# Patient Record
Sex: MALE | Race: WHITE | NOT HISPANIC OR LATINO
[De-identification: names, ages, dates, MRNs, and addresses within clinical notes are randomized per-mention and may not be internally consistent; named-entity substitution may affect disease eponyms.]

---

## 2021-10-04 PROBLEM — Z00.00 ENCOUNTER FOR PREVENTIVE HEALTH EXAMINATION: Status: ACTIVE | Noted: 2021-10-04

## 2021-10-08 DIAGNOSIS — K85.80 OTHER ACUTE PANCREATITIS WITHOUT NECROSIS OR INFECTION: ICD-10-CM

## 2021-11-04 ENCOUNTER — RESULT REVIEW (OUTPATIENT)
Age: 46
End: 2021-11-04

## 2021-11-09 ENCOUNTER — APPOINTMENT (OUTPATIENT)
Dept: GASTROENTEROLOGY | Facility: CLINIC | Age: 46
End: 2021-11-09
Payer: COMMERCIAL

## 2021-11-09 PROCEDURE — 99443: CPT

## 2021-11-26 DIAGNOSIS — Z01.812 ENCOUNTER FOR PREPROCEDURAL LABORATORY EXAMINATION: ICD-10-CM

## 2021-11-27 ENCOUNTER — APPOINTMENT (OUTPATIENT)
Dept: DISASTER EMERGENCY | Facility: CLINIC | Age: 46
End: 2021-11-27

## 2021-11-29 LAB — SARS-COV-2 N GENE NPH QL NAA+PROBE: NOT DETECTED

## 2021-11-30 ENCOUNTER — APPOINTMENT (OUTPATIENT)
Dept: GASTROENTEROLOGY | Facility: HOSPITAL | Age: 46
End: 2021-11-30

## 2021-11-30 ENCOUNTER — OUTPATIENT (OUTPATIENT)
Dept: OUTPATIENT SERVICES | Facility: HOSPITAL | Age: 46
LOS: 1 days | End: 2021-11-30
Payer: COMMERCIAL

## 2021-11-30 VITALS
RESPIRATION RATE: 22 BRPM | WEIGHT: 235.01 LBS | OXYGEN SATURATION: 97 % | TEMPERATURE: 97 F | DIASTOLIC BLOOD PRESSURE: 70 MMHG | SYSTOLIC BLOOD PRESSURE: 137 MMHG | HEART RATE: 73 BPM

## 2021-11-30 VITALS
OXYGEN SATURATION: 97 % | WEIGHT: 235.01 LBS | TEMPERATURE: 97 F | HEART RATE: 73 BPM | RESPIRATION RATE: 22 BRPM | DIASTOLIC BLOOD PRESSURE: 70 MMHG | SYSTOLIC BLOOD PRESSURE: 137 MMHG

## 2021-11-30 DIAGNOSIS — K85.80 OTHER ACUTE PANCREATITIS WITHOUT NECROSIS OR INFECTION: ICD-10-CM

## 2021-11-30 DIAGNOSIS — Z98.890 OTHER SPECIFIED POSTPROCEDURAL STATES: Chronic | ICD-10-CM

## 2021-11-30 PROCEDURE — 43259 EGD US EXAM DUODENUM/JEJUNUM: CPT

## 2021-11-30 PROCEDURE — 43259 EGD US EXAM DUODENUM/JEJUNUM: CPT | Mod: GC

## 2021-11-30 RX ORDER — BUDESONIDE, MICRONIZED 100 %
0 POWDER (GRAM) MISCELLANEOUS
Qty: 0 | Refills: 0 | DISCHARGE

## 2021-11-30 RX ORDER — ATORVASTATIN CALCIUM 80 MG/1
1 TABLET, FILM COATED ORAL
Qty: 0 | Refills: 0 | DISCHARGE

## 2021-11-30 RX ORDER — NIFEDIPINE 30 MG
1 TABLET, EXTENDED RELEASE 24 HR ORAL
Qty: 0 | Refills: 0 | DISCHARGE

## 2021-11-30 RX ORDER — METOPROLOL TARTRATE 50 MG
1 TABLET ORAL
Qty: 0 | Refills: 0 | DISCHARGE

## 2021-11-30 RX ORDER — LISINOPRIL 2.5 MG/1
1 TABLET ORAL
Qty: 0 | Refills: 0 | DISCHARGE

## 2021-11-30 RX ORDER — SODIUM CHLORIDE 9 MG/ML
500 INJECTION INTRAMUSCULAR; INTRAVENOUS; SUBCUTANEOUS
Refills: 0 | Status: COMPLETED | OUTPATIENT
Start: 2021-11-30 | End: 2021-11-30

## 2021-11-30 RX ADMIN — SODIUM CHLORIDE 75 MILLILITER(S): 9 INJECTION INTRAMUSCULAR; INTRAVENOUS; SUBCUTANEOUS at 08:30

## 2021-11-30 NOTE — PRE PROCEDURE NOTE - PRE PROCEDURE EVALUATION
Attending Physician:         Dr. Sparks                   Procedure: EUS     Indication for Procedure: recurrent acute pancreatitis   ________________________________________________________  PAST MEDICAL & SURGICAL HISTORY:    ALLERGIES:  Allergy Status Unknown    HOME MEDICATIONS:    AICD/PPM: [ ] yes   [ ] no    PERTINENT LAB DATA:                      PHYSICAL EXAMINATION:    T(C): --  HR: --  BP: --  RR: --  SpO2: --    Constitutional: NAD  HEENT: PERRLA, EOMI,    Neck:  No JVD  Respiratory: CTAB/L  Cardiovascular: S1 and S2  Gastrointestinal: BS+, soft, NT/ND  Extremities: No peripheral edema  Neurological: A/O x 3, no focal deficits  Psychiatric: Normal mood, normal affect  Skin: No rashes    ASA Class: I [ ]  II [ ]  III [ ]  IV [ ]    COMMENTS:    The patient is a suitable candidate for the planned procedure unless box checked [ ]  No, explain:

## 2021-11-30 NOTE — ASU PATIENT PROFILE, ADULT - FALL HARM RISK - UNIVERSAL INTERVENTIONS
Bed in lowest position, wheels locked, appropriate side rails in place/Instruct patient to call for assistance before getting out of bed or chair/Non-slip footwear when patient is out of bed/Physically safe environment - no spills, clutter or unnecessary equipment/Purposeful Proactive Rounding

## 2021-11-30 NOTE — ASU DISCHARGE PLAN (ADULT/PEDIATRIC) - NS MD DC FALL RISK RISK
For information on Fall & Injury Prevention, visit: [email or web address] OR  [email or web address] OR  https://www.cdc.gov/steadi/patient.html

## 2022-05-23 PROBLEM — I10 ESSENTIAL (PRIMARY) HYPERTENSION: Chronic | Status: ACTIVE | Noted: 2021-11-30

## 2022-05-23 PROBLEM — K21.9 GASTRO-ESOPHAGEAL REFLUX DISEASE WITHOUT ESOPHAGITIS: Chronic | Status: ACTIVE | Noted: 2021-11-30

## 2022-05-23 PROBLEM — E78.5 HYPERLIPIDEMIA, UNSPECIFIED: Chronic | Status: ACTIVE | Noted: 2021-11-30

## 2022-07-07 ENCOUNTER — APPOINTMENT (OUTPATIENT)
Dept: GASTROENTEROLOGY | Facility: CLINIC | Age: 47
End: 2022-07-07

## 2022-07-07 VITALS
SYSTOLIC BLOOD PRESSURE: 128 MMHG | OXYGEN SATURATION: 98 % | WEIGHT: 253.5 LBS | DIASTOLIC BLOOD PRESSURE: 75 MMHG | HEART RATE: 78 BPM | HEIGHT: 72 IN | BODY MASS INDEX: 34.34 KG/M2

## 2022-07-07 DIAGNOSIS — Z87.891 PERSONAL HISTORY OF NICOTINE DEPENDENCE: ICD-10-CM

## 2022-07-07 DIAGNOSIS — K65.4 SCLEROSING MESENTERITIS: ICD-10-CM

## 2022-07-07 DIAGNOSIS — Z80.1 FAMILY HISTORY OF MALIGNANT NEOPLASM OF TRACHEA, BRONCHUS AND LUNG: ICD-10-CM

## 2022-07-07 PROCEDURE — 99214 OFFICE O/P EST MOD 30 MIN: CPT

## 2022-07-07 RX ORDER — OMEPRAZOLE 20 MG/1
20 CAPSULE, DELAYED RELEASE ORAL DAILY
Qty: 1 | Refills: 0 | Status: ACTIVE | COMMUNITY
Start: 2022-07-07 | End: 1900-01-01

## 2022-07-08 LAB
CREAT SERPL-MCNC: 0.95 MG/DL
CRP SERPL-MCNC: <3 MG/L
EGFR: 101 ML/MIN/1.73M2
ERYTHROCYTE [SEDIMENTATION RATE] IN BLOOD BY WESTERGREN METHOD: 14 MM/HR

## 2022-07-11 PROBLEM — Z80.1 FAMILY HISTORY OF LUNG CANCER: Status: ACTIVE | Noted: 2022-07-11

## 2022-07-11 PROBLEM — Z87.891 FORMER SMOKER: Status: ACTIVE | Noted: 2022-07-11

## 2022-07-11 LAB
ANA SER IF-ACNC: NEGATIVE
ENA SCL70 IGG SER IA-ACNC: <0.2 AL
SMOOTH MUSCLE AB SER QL IF: NORMAL

## 2022-07-11 NOTE — CONSULT LETTER
[Dear  ___] : Dear  [unfilled], [Consult Letter:] : I had the pleasure of evaluating your patient, [unfilled]. [Please see my note below.] : Please see my note below. [Consult Closing:] : Thank you very much for allowing me to participate in the care of this patient.  If you have any questions, please do not hesitate to contact me. [Sincerely,] : Sincerely, [FreeTextEntry3] : Kwesi Bonilla MD, MPH, ARIE, RDAHA\par Chief of Clinical Quality in Gastroenterology, [facility]\par Associate Chief of Gastroenterology, [facility]\par Interim Director of Endoscopy, [facility]\par Director of Endoscopic Ultrasound, [facility]\par [address], [address]\par [facility] [place] [facility], [address]\par 24 hours [phone]\par \par  [DrHomar  ___] : Dr. KESSLER

## 2022-07-11 NOTE — HISTORY OF PRESENT ILLNESS
[FreeTextEntry1] : Patient referred by primary gastroenterologist Dr. Kaleb Ramirez for mesenteric panniculitis\par Has vague upper abdominal pain within 1 year.\par No fevers, chills, sweats, heartburn, dysphagia, nausea, vomiting, constipation, diarrhea, melena, hematochezia, jaundice or weight loss.\par \par No family history of inflammatory conditions\par Lung cancer in father (smoker / )\par \par Had CT 3/17/22 abd/pelvis with contrast, report reviewed.  Radiologist compared to prior CT scan 2021.\par Normal pancreas.  Normal bile ducts.\par Minimal stable stranding within the mesentery with stable prominent mesenteric lymph nodes\par \par \par EGD/EUS/colonoscopy within past 1 year\par  EGD/EUS: 11/30/21:\par Findings:\par      ENDOSCOPIC FINDING: :\par      LA Grade B (one or more mucosal breaks greater than 5 mm, not extending between the tops of \par      two mucosal folds) esophagitis with no bleeding was found in the distal esophagus.\par      No other significant abnormalities were identified in a careful examination of the esophagus.\par      The entire examined stomach was normal.\par      The duodenal bulb and second portion of the duodenum were normal.\par      ENDOSONOGRAPHIC FINDING: :\par      An EUS exam was performed with a linear echoendoscope. There was mild heterogenity of the \par      pancreatic body. No mass lesions or cysts seen.\par      There was othwerise no sign of significant endosonographic abnormality in the pancreatic \par      head, remainder of the pancreatic body, neck, pancreatic tail, and peripancreatic region. The \par      pancreas was well visualized, no masses, no cysts, no calcifications seen. The main PD was \par      unremarkable. There was a 10mm triangular-shaped peripancreatic lymph node that was noted.\par      There was no sign of significant endosonographic abnormality in the common bile duct and in \par      the gallbladder. No stones, no biliary sludge, ducts of normal caliber and ducts with regular \par      contour were identified.\par      There was no sign of significant endosonographic abnormality in the visualized portion of the \par      liver. No focal pathology and no pathologic lymphadenopathy were identified.\par      The region of the celiac plexus and celiac ganglia was visualized and showed no sign of \par      significant endosonographic abnormality. The vascular anatomy of the region was unremarkable.\par                                                                                                     \par Impression:          EUS exam without any pancreatic lesions or CBD stones. No etiology of \par                      pancreatitis found.\par \par

## 2022-07-11 NOTE — ASSESSMENT
[FreeTextEntry1] : Impression:\par \par #1.  Referred for mesenteric panniculitis on CT scan, since at least 2021.  Work-up has included upper endoscopy, endoscopic ultrasound, colonoscopy, serial CT scan.\par \par Differential diagnosis of mesenteric panniculitis includes inflammatory/infectious conditions, neoplastic conditions\par \par #2.  Chronic upper abdominal pain\par \par #3.  History of acute pancreatitis in 2021, endoscopic ultrasound negative for obvious etiology.\par \par Recommendations:\par \par #1.  Laboratory testing as below\par \par #2.  MRI/MRCP with and without contrast\par \par #3.  Dr. Ramirez's [place] group to perform video capsule endoscopy as an outpatient to look for small bowel lesions.\par \par #4.  If no etiology found on above testing, would have patient referred to rheumatology.\par \par

## 2023-01-30 ENCOUNTER — APPOINTMENT (OUTPATIENT)
Dept: ORTHOPEDIC SURGERY | Facility: CLINIC | Age: 48
End: 2023-01-30
Payer: OTHER MISCELLANEOUS

## 2023-01-30 VITALS — HEIGHT: 72 IN | WEIGHT: 265 LBS | BODY MASS INDEX: 35.89 KG/M2

## 2023-01-30 DIAGNOSIS — I10 ESSENTIAL (PRIMARY) HYPERTENSION: ICD-10-CM

## 2023-01-30 DIAGNOSIS — E78.00 PURE HYPERCHOLESTEROLEMIA, UNSPECIFIED: ICD-10-CM

## 2023-01-30 PROCEDURE — 99203 OFFICE O/P NEW LOW 30 MIN: CPT

## 2023-01-30 PROCEDURE — 99072 ADDL SUPL MATRL&STAF TM PHE: CPT

## 2023-01-30 RX ORDER — MELOXICAM 15 MG/1
15 TABLET ORAL
Qty: 30 | Refills: 0 | Status: ACTIVE | COMMUNITY
Start: 2023-01-30 | End: 1900-01-01

## 2023-01-30 NOTE — HISTORY OF PRESENT ILLNESS
[Work related] : work related [Gradual] : gradual [Sudden] : sudden [6] : 6 [5] : 5 [Dull/Aching] : dull/aching [Shooting] : shooting [Tingling] : tingling [Sleep] : sleep [Nothing helps with pain getting better] : Nothing helps with pain getting better [Full time] : Work status: full time [de-identified] : He was pounding channels at work\par L elbow and forearm pain  [] : no [FreeTextEntry1] : left elbow  [FreeTextEntry3] : 01/11/23 [FreeTextEntry5] : patient states when he was at work was pounding a channel into the ground, he cant  is having pain  [FreeTextEntry6] : numbness [FreeTextEntry7] : down the arm  [de-identified] : activity  [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

## 2023-01-30 NOTE — PHYSICAL EXAM
[de-identified] : L elbow:\par mild swelling\par Tender lateral epicondyle\par Pain with ROM\par Pain with forced wrist extension

## 2023-02-02 ENCOUNTER — NON-APPOINTMENT (OUTPATIENT)
Age: 48
End: 2023-02-02

## 2023-02-27 ENCOUNTER — APPOINTMENT (OUTPATIENT)
Dept: ORTHOPEDIC SURGERY | Facility: CLINIC | Age: 48
End: 2023-02-27
Payer: OTHER MISCELLANEOUS

## 2023-02-27 VITALS — HEIGHT: 72 IN | WEIGHT: 265 LBS | BODY MASS INDEX: 35.89 KG/M2

## 2023-02-27 DIAGNOSIS — S53.402A UNSPECIFIED SPRAIN OF LEFT ELBOW, INITIAL ENCOUNTER: ICD-10-CM

## 2023-02-27 PROCEDURE — 99072 ADDL SUPL MATRL&STAF TM PHE: CPT

## 2023-02-27 PROCEDURE — 99213 OFFICE O/P EST LOW 20 MIN: CPT

## 2023-02-27 NOTE — HISTORY OF PRESENT ILLNESS
[Work related] : work related [3] : 3 [1] : 2 [Dull/Aching] : dull/aching [Full time] : Work status: full time [] : yes [de-identified] : L elbow is better\par NSAIDS and therapy helping  [FreeTextEntry1] :  left elbow  [FreeTextEntry5] : pain is better\par  [de-identified] : no

## 2023-02-27 NOTE — PHYSICAL EXAM
[de-identified] : L elbow:\par mild swelling\par Tender lateral epicondyle\par Pain with ROM\par Pain with forced wrist extension

## 2023-04-20 ENCOUNTER — APPOINTMENT (OUTPATIENT)
Dept: ORTHOPEDIC SURGERY | Facility: CLINIC | Age: 48
End: 2023-04-20
Payer: OTHER MISCELLANEOUS

## 2023-04-20 VITALS — WEIGHT: 265 LBS | HEIGHT: 72 IN | BODY MASS INDEX: 35.89 KG/M2

## 2023-04-20 PROCEDURE — 99213 OFFICE O/P EST LOW 20 MIN: CPT

## 2023-04-21 NOTE — HISTORY OF PRESENT ILLNESS
[Work related] : work related [5] : 5 [4] : 4 [Dull/Aching] : dull/aching [Full time] : Work status: full time [de-identified] : L elbow is stable [FreeTextEntry1] : L elbow [de-identified] : none

## 2023-04-21 NOTE — PHYSICAL EXAM
[de-identified] : L elbow:\par mild swelling\par Tender lateral epicondyle\par Pain with ROM\par Pain with forced wrist extension

## 2023-05-25 ENCOUNTER — RX RENEWAL (OUTPATIENT)
Age: 48
End: 2023-05-25

## 2023-05-25 RX ORDER — MELOXICAM 15 MG/1
15 TABLET ORAL
Qty: 30 | Refills: 3 | Status: ACTIVE | COMMUNITY
Start: 2023-02-27 | End: 1900-01-01

## 2023-06-14 ENCOUNTER — NON-APPOINTMENT (OUTPATIENT)
Age: 48
End: 2023-06-14

## 2023-06-14 ENCOUNTER — APPOINTMENT (OUTPATIENT)
Dept: ORTHOPEDIC SURGERY | Facility: CLINIC | Age: 48
End: 2023-06-14
Payer: OTHER MISCELLANEOUS

## 2023-06-14 VITALS — WEIGHT: 265 LBS | BODY MASS INDEX: 35.89 KG/M2 | HEIGHT: 72 IN

## 2023-06-14 PROCEDURE — 99214 OFFICE O/P EST MOD 30 MIN: CPT

## 2023-06-14 RX ORDER — METHYLPREDNISOLONE 4 MG/1
4 TABLET ORAL
Qty: 1 | Refills: 0 | Status: ACTIVE | COMMUNITY
Start: 2023-06-14

## 2023-06-14 NOTE — WORK
[Sprain/Strain] : sprain/strain [Was the competent medical cause of the injury] : was the competent medical cause of the injury [Are consistent with the injury] : are consistent with the injury [Consistent with my objective findings] : consistent with my objective findings [Total (100%)] : total (100%) [Does not reveal pre-existing condition(s) that may affect treatment/prognosis] : does not reveal pre-existing condition(s) that may affect treatment/prognosis [Cannot return to work because ________] : cannot return to work because [unfilled] [Rx may affect patient's ability to return to work, make patient drowsy, or other issue] : Rx may affect patient's ability to return to work, make patient drowsy, or other issue. [FreeTextEntry1] : guarded

## 2023-06-14 NOTE — IMAGING
[de-identified] : L spine\par \par Inspection: No rash or ecchymosis \par \par Palpation: Midline lumbar tenderness. R lumbar paraspinal tenderness\par \par ROM: diminished all planes\par \par Strength: 5/5 bilateral hip flexors, knee extensors, ankle dorsiflexors, EHL, ankle plantarflexors \par \par Sensation: Sensation present to light touch bilateral L2-S1 distributions \par \par Provocative maneuvers: + R SLR, - L SLR. Tight hamstrings bilaterally\par \par \par MRI Lumbar spine reviewed and interpreted independently.  Agree with report as follows. \par \par Lowest lumbar type vertebra is labeled L5, but on the axial views there is a suggestion for possible partial sacralization. If necessary, correlation with plain films is suggested.\par  \par \par  \par \par  \par \par Levoscoliosis with preservation of the lumbar lordosis.\par  \par \par  \par \par  \par \par L1-L2 facets are hypertrophic with abutment of the lateral recesses and encroachment on the posterior thecal sac along with ligamentum flavum hypertrophy.\par  \par \par  \par \par  \par \par L2-L3 annular disc bulge abutting the anterior thecal sac with associated inferior foraminal disc extensions approaching the exiting left L2 nerve root. Facets are hypertrophic with encroachment on the lateral recesses, left greater than right, and encroachment on the posterior thecal sac along with ligamentum flavum hypertrophy.\par  \par \par  \par \par  \par \par L3-L4 annular disc bulge abutting the anterior thecal sac with associated inferior foraminal disc extensions abutting the exiting L3 nerve roots and abutting the adjacent traversing proximal neural fibers. Facets are hypertrophic with impingement on the lateral recesses and encroachment on the posterior thecal sac along with ligamentum flavum hypertrophy.\par  \par \par  \par \par  \par \par L4-L5 annular disc bulge abutting the anterior thecal sac with associated inferior foraminal disc extensions encroaching on the exiting L4 nerve roots, right slightly greater than left, and abutting the adjacent traversing proximal right neural fibers. Facets are hypertrophic with impingement on the lateral recesses. Central stenosis related to the above and ligamentum flavum hypertrophy.\par  \par \par  \par \par  \par \par L5-S1 annular disc bulge abutting the anterior thecal sac with associated inferior foraminal disc extensions abutting the exiting L5 nerve roots and abutting the traversing proximal right S1 nerve root. Facets are hypertrophic with impingement on the lateral recesses. Shallow central canal related to the above.\par  \par \par  \par \par  \par \par Loss of vertebral body height of T12 without underlying edema, nonspecific, and could be related to chronic compression injury. Clinical correlation and correlation with plain films if necessary is suggested.\par  \par \par  \par \par  \par \par Lesion in the right posterior L3 vertebra, nonspecific, but consistent with a hemangioma. There may be a small hemangioma in the right sacrum as well. Periodic follow-up is suggested to assess stability.

## 2023-06-14 NOTE — ASSESSMENT
[FreeTextEntry1] : Patient is OOW\par PT\par MDP\par FU 4 weeks\par I fpain perists will send for ADRIAN\par  We will also prescribe Muscle Relaxants as needed.  Discussed side effects including but not limited to drowsiness and dizziness.  Discussed patient should not drive after taking the medicine. \par \par Patient seen by Sravanthi Rodriguez PA-C, with and under the supervision of Dr. Satish Bender MD
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
## 2023-07-10 ENCOUNTER — FORM ENCOUNTER (OUTPATIENT)
Age: 48
End: 2023-07-10

## 2023-07-12 ENCOUNTER — NON-APPOINTMENT (OUTPATIENT)
Age: 48
End: 2023-07-12

## 2023-07-12 ENCOUNTER — APPOINTMENT (OUTPATIENT)
Dept: ORTHOPEDIC SURGERY | Facility: CLINIC | Age: 48
End: 2023-07-12

## 2023-07-12 ENCOUNTER — APPOINTMENT (OUTPATIENT)
Dept: ORTHOPEDIC SURGERY | Facility: CLINIC | Age: 48
End: 2023-07-12
Payer: OTHER MISCELLANEOUS

## 2023-07-12 VITALS — HEIGHT: 72 IN | WEIGHT: 265 LBS | BODY MASS INDEX: 35.89 KG/M2

## 2023-07-12 PROCEDURE — ZZZZZ: CPT

## 2023-07-12 PROCEDURE — 99213 OFFICE O/P EST LOW 20 MIN: CPT
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
## 2023-07-12 NOTE — IMAGING
[de-identified] : L spine\par \par Inspection: No rash or ecchymosis \par \par Palpation: Midline lumbar tenderness. R lumbar paraspinal tenderness\par \par ROM: diminished all planes\par \par Strength: 5/5 bilateral hip flexors, knee extensors, ankle dorsiflexors, EHL, ankle plantarflexors \par \par Sensation: Sensation present to light touch bilateral L2-S1 distributions \par \par Provocative maneuvers: + R SLR, - L SLR. Tight hamstrings bilaterally\par \par \par MRI Lumbar spine reviewed and interpreted independently.  Agree with report as follows. \par \par Lowest lumbar type vertebra is labeled L5, but on the axial views there is a suggestion for possible partial sacralization. If necessary, correlation with plain films is suggested.\par  \par \par  \par \par  \par \par Levoscoliosis with preservation of the lumbar lordosis.\par  \par \par  \par \par  \par \par L1-L2 facets are hypertrophic with abutment of the lateral recesses and encroachment on the posterior thecal sac along with ligamentum flavum hypertrophy.\par  \par \par  \par \par  \par \par L2-L3 annular disc bulge abutting the anterior thecal sac with associated inferior foraminal disc extensions approaching the exiting left L2 nerve root. Facets are hypertrophic with encroachment on the lateral recesses, left greater than right, and encroachment on the posterior thecal sac along with ligamentum flavum hypertrophy.\par  \par \par  \par \par  \par \par L3-L4 annular disc bulge abutting the anterior thecal sac with associated inferior foraminal disc extensions abutting the exiting L3 nerve roots and abutting the adjacent traversing proximal neural fibers. Facets are hypertrophic with impingement on the lateral recesses and encroachment on the posterior thecal sac along with ligamentum flavum hypertrophy.\par  \par \par  \par \par  \par \par L4-L5 annular disc bulge abutting the anterior thecal sac with associated inferior foraminal disc extensions encroaching on the exiting L4 nerve roots, right slightly greater than left, and abutting the adjacent traversing proximal right neural fibers. Facets are hypertrophic with impingement on the lateral recesses. Central stenosis related to the above and ligamentum flavum hypertrophy.\par  \par \par  \par \par  \par \par L5-S1 annular disc bulge abutting the anterior thecal sac with associated inferior foraminal disc extensions abutting the exiting L5 nerve roots and abutting the traversing proximal right S1 nerve root. Facets are hypertrophic with impingement on the lateral recesses. Shallow central canal related to the above.\par  \par \par  \par \par  \par \par Loss of vertebral body height of T12 without underlying edema, nonspecific, and could be related to chronic compression injury. Clinical correlation and correlation with plain films if necessary is suggested.\par  \par \par  \par \par  \par \par Lesion in the right posterior L3 vertebra, nonspecific, but consistent with a hemangioma. There may be a small hemangioma in the right sacrum as well. Periodic follow-up is suggested to assess stability.

## 2023-07-12 NOTE — ASSESSMENT
[FreeTextEntry1] : Patient is OOW\par C/w PT\par \par FU 4 weeks\par If pain perists will send for ADRIAN\par  We will also prescribe Muscle Relaxants as needed.  Discussed side effects including but not limited to drowsiness and dizziness.  Discussed patient should not drive after taking the medicine. \par

## 2023-08-07 ENCOUNTER — APPOINTMENT (OUTPATIENT)
Dept: ORTHOPEDIC SURGERY | Facility: CLINIC | Age: 48
End: 2023-08-07
Payer: OTHER MISCELLANEOUS

## 2023-08-07 VITALS — WEIGHT: 265 LBS | BODY MASS INDEX: 35.89 KG/M2 | HEIGHT: 72 IN

## 2023-08-07 DIAGNOSIS — M77.12 LATERAL EPICONDYLITIS, LEFT ELBOW: ICD-10-CM

## 2023-08-07 PROCEDURE — 99213 OFFICE O/P EST LOW 20 MIN: CPT

## 2023-08-07 NOTE — HISTORY OF PRESENT ILLNESS
[Work related] : work related [5] : 5 [4] : 4 [Dull/Aching] : dull/aching [Full time] : Work status: full time [] : yes [de-identified] : L elbow has some pain  He is OOW for back [FreeTextEntry1] : L elbow [de-identified] : no

## 2023-08-07 NOTE — PHYSICAL EXAM
[de-identified] : L elbow:\par  mild swelling\par  Tender lateral epicondyle\par  Pain with ROM\par  Pain with forced wrist extension

## 2023-08-16 ENCOUNTER — NON-APPOINTMENT (OUTPATIENT)
Age: 48
End: 2023-08-16

## 2023-08-16 ENCOUNTER — APPOINTMENT (OUTPATIENT)
Dept: ORTHOPEDIC SURGERY | Facility: CLINIC | Age: 48
End: 2023-08-16
Payer: OTHER MISCELLANEOUS

## 2023-08-16 VITALS — HEIGHT: 72 IN | WEIGHT: 265 LBS | BODY MASS INDEX: 35.89 KG/M2

## 2023-08-16 PROCEDURE — 99213 OFFICE O/P EST LOW 20 MIN: CPT

## 2023-08-16 NOTE — ASSESSMENT
[FreeTextEntry1] : 47M with lBP adn spasm  that is improving with PT. Patient is OOW C/w PT  FU 4 weeks If pain persists will send for ADRIAN  We will also prescribe Muscle Relaxants as needed.  Discussed side effects including but not limited to drowsiness and dizziness.  Discussed patient should not drive after taking the medicine.

## 2023-08-16 NOTE — IMAGING
[de-identified] : L spine\par  \par  Inspection: No rash or ecchymosis \par  \par  Palpation: Midline lumbar tenderness. R lumbar paraspinal tenderness\par  \par  ROM: diminished all planes\par  \par  Strength: 5/5 bilateral hip flexors, knee extensors, ankle dorsiflexors, EHL, ankle plantarflexors \par  \par  Sensation: Sensation present to light touch bilateral L2-S1 distributions \par  \par  Provocative maneuvers: + R SLR, - L SLR. Tight hamstrings bilaterally\par  \par  \par  MRI Lumbar spine reviewed and interpreted independently.  Agree with report as follows. \par  \par  Lowest lumbar type vertebra is labeled L5, but on the axial views there is a suggestion for possible partial sacralization. If necessary, correlation with plain films is suggested.\par   \par  \par   \par  \par   \par  \par  Levoscoliosis with preservation of the lumbar lordosis.\par   \par  \par   \par  \par   \par  \par  L1-L2 facets are hypertrophic with abutment of the lateral recesses and encroachment on the posterior thecal sac along with ligamentum flavum hypertrophy.\par   \par  \par   \par  \par   \par  \par  L2-L3 annular disc bulge abutting the anterior thecal sac with associated inferior foraminal disc extensions approaching the exiting left L2 nerve root. Facets are hypertrophic with encroachment on the lateral recesses, left greater than right, and encroachment on the posterior thecal sac along with ligamentum flavum hypertrophy.\par   \par  \par   \par  \par   \par  \par  L3-L4 annular disc bulge abutting the anterior thecal sac with associated inferior foraminal disc extensions abutting the exiting L3 nerve roots and abutting the adjacent traversing proximal neural fibers. Facets are hypertrophic with impingement on the lateral recesses and encroachment on the posterior thecal sac along with ligamentum flavum hypertrophy.\par   \par  \par   \par  \par   \par  \par  L4-L5 annular disc bulge abutting the anterior thecal sac with associated inferior foraminal disc extensions encroaching on the exiting L4 nerve roots, right slightly greater than left, and abutting the adjacent traversing proximal right neural fibers. Facets are hypertrophic with impingement on the lateral recesses. Central stenosis related to the above and ligamentum flavum hypertrophy.\par   \par  \par   \par  \par   \par  \par  L5-S1 annular disc bulge abutting the anterior thecal sac with associated inferior foraminal disc extensions abutting the exiting L5 nerve roots and abutting the traversing proximal right S1 nerve root. Facets are hypertrophic with impingement on the lateral recesses. Shallow central canal related to the above.\par   \par  \par   \par  \par   \par  \par  Loss of vertebral body height of T12 without underlying edema, nonspecific, and could be related to chronic compression injury. Clinical correlation and correlation with plain films if necessary is suggested.\par   \par  \par   \par  \par   \par  \par  Lesion in the right posterior L3 vertebra, nonspecific, but consistent with a hemangioma. There may be a small hemangioma in the right sacrum as well. Periodic follow-up is suggested to assess stability.
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
## 2023-09-06 NOTE — ASU DISCHARGE PLAN (ADULT/PEDIATRIC) - HISTORY OF COVID-19 VACCINATION
ABHISHEK Wei ABHISHEK Zavala ABHISHEK Zavala ABHISHEK Zavala NP Elisha ABHISHEK Wang Dr. KAILEY Leal Dr. Lawson NP Elisha Yes

## 2023-09-13 ENCOUNTER — APPOINTMENT (OUTPATIENT)
Dept: ORTHOPEDIC SURGERY | Facility: CLINIC | Age: 48
End: 2023-09-13
Payer: OTHER MISCELLANEOUS

## 2023-09-13 ENCOUNTER — NON-APPOINTMENT (OUTPATIENT)
Age: 48
End: 2023-09-13

## 2023-09-13 VITALS — HEIGHT: 72 IN | WEIGHT: 265 LBS | BODY MASS INDEX: 35.89 KG/M2

## 2023-09-13 PROCEDURE — 99213 OFFICE O/P EST LOW 20 MIN: CPT

## 2023-10-11 ENCOUNTER — NON-APPOINTMENT (OUTPATIENT)
Age: 48
End: 2023-10-11

## 2023-10-11 ENCOUNTER — APPOINTMENT (OUTPATIENT)
Dept: ORTHOPEDIC SURGERY | Facility: CLINIC | Age: 48
End: 2023-10-11
Payer: OTHER MISCELLANEOUS

## 2023-10-11 VITALS — HEIGHT: 72 IN | WEIGHT: 265 LBS | BODY MASS INDEX: 35.89 KG/M2

## 2023-10-11 PROCEDURE — 99213 OFFICE O/P EST LOW 20 MIN: CPT

## 2023-11-06 ENCOUNTER — APPOINTMENT (OUTPATIENT)
Dept: ORTHOPEDIC SURGERY | Facility: CLINIC | Age: 48
End: 2023-11-06

## 2023-11-22 ENCOUNTER — APPOINTMENT (OUTPATIENT)
Dept: ORTHOPEDIC SURGERY | Facility: CLINIC | Age: 48
End: 2023-11-22
Payer: OTHER MISCELLANEOUS

## 2023-11-22 VITALS — BODY MASS INDEX: 35.89 KG/M2 | HEIGHT: 72 IN | WEIGHT: 265 LBS

## 2023-11-22 PROCEDURE — 99213 OFFICE O/P EST LOW 20 MIN: CPT

## 2024-01-17 ENCOUNTER — APPOINTMENT (OUTPATIENT)
Dept: ORTHOPEDIC SURGERY | Facility: CLINIC | Age: 49
End: 2024-01-17
Payer: OTHER MISCELLANEOUS

## 2024-01-17 VITALS — BODY MASS INDEX: 35.89 KG/M2 | HEIGHT: 72 IN | WEIGHT: 265 LBS

## 2024-01-17 DIAGNOSIS — M54.16 RADICULOPATHY, LUMBAR REGION: ICD-10-CM

## 2024-01-17 DIAGNOSIS — S39.012A STRAIN OF MUSCLE, FASCIA AND TENDON OF LOWER BACK, INITIAL ENCOUNTER: ICD-10-CM

## 2024-01-17 PROCEDURE — 99213 OFFICE O/P EST LOW 20 MIN: CPT

## 2024-01-17 RX ORDER — CYCLOBENZAPRINE HYDROCHLORIDE 5 MG/1
5 TABLET, FILM COATED ORAL 3 TIMES DAILY
Qty: 30 | Refills: 0 | Status: ACTIVE | COMMUNITY
Start: 2023-06-14 | End: 1900-01-01

## 2024-01-17 NOTE — ASSESSMENT
[FreeTextEntry1] : 48 M with low back pain that i improved with PT.  But has some residual soreness after work  c/.w HEP C/w OTC NSAIDS PRN   Discussed major side effects of NSAIDS including but not limited to gastritis and acute kidney injury.  He was instructed to take with food and to discontinue use if stomach or esophageal pain developed.  FU PRN

## 2024-01-17 NOTE — RETURN TO WORK/SCHOOL
[Full Duty] : full duty [Return Date: _____] : [unfilled] can return to school as of [unfilled] [Work] : work [___ Weeks] : I will re-evaluate the patient in [unfilled] week(s), at which time I will provide an update to their current status [FreeTextEntry1] : Patient evaluated in the office of Dr. Bender on 10/11/23. It is recommended that patient remain out of work with plan to return on 10/30/23 at full duty.
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
## 2024-01-17 NOTE — WORK
[Sprain/Strain] : sprain/strain [Was the competent medical cause of the injury] : was the competent medical cause of the injury [Are consistent with the injury] : are consistent with the injury [Consistent with my objective findings] : consistent with my objective findings

## 2024-10-02 ENCOUNTER — APPOINTMENT (OUTPATIENT)
Dept: ORTHOPEDIC SURGERY | Facility: CLINIC | Age: 49
End: 2024-10-02
Payer: OTHER MISCELLANEOUS

## 2024-10-02 VITALS — BODY MASS INDEX: 35.89 KG/M2 | HEIGHT: 72 IN | WEIGHT: 265 LBS

## 2024-10-02 DIAGNOSIS — S39.012A STRAIN OF MUSCLE, FASCIA AND TENDON OF LOWER BACK, INITIAL ENCOUNTER: ICD-10-CM

## 2024-10-02 PROCEDURE — 99213 OFFICE O/P EST LOW 20 MIN: CPT

## 2024-10-02 RX ORDER — MELOXICAM 15 MG/1
15 TABLET ORAL DAILY
Qty: 30 | Refills: 0 | Status: ACTIVE | COMMUNITY
Start: 2024-10-02 | End: 1900-01-01
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
## 2024-10-02 NOTE — IMAGING
[de-identified] : L spine\par  \par  Inspection: No rash or ecchymosis \par  \par  Palpation: Midline lumbar tenderness. R lumbar paraspinal tenderness\par  \par  ROM: diminished all planes\par  \par  Strength: 5/5 bilateral hip flexors, knee extensors, ankle dorsiflexors, EHL, ankle plantarflexors \par  \par  Sensation: Sensation present to light touch bilateral L2-S1 distributions \par  \par  Provocative maneuvers: + R SLR, - L SLR. Tight hamstrings bilaterally\par  \par  \par  MRI Lumbar spine reviewed and interpreted independently.  Agree with report as follows. \par  \par  Lowest lumbar type vertebra is labeled L5, but on the axial views there is a suggestion for possible partial sacralization. If necessary, correlation with plain films is suggested.\par   \par  \par   \par  \par   \par  \par  Levoscoliosis with preservation of the lumbar lordosis.\par   \par  \par   \par  \par   \par  \par  L1-L2 facets are hypertrophic with abutment of the lateral recesses and encroachment on the posterior thecal sac along with ligamentum flavum hypertrophy.\par   \par  \par   \par  \par   \par  \par  L2-L3 annular disc bulge abutting the anterior thecal sac with associated inferior foraminal disc extensions approaching the exiting left L2 nerve root. Facets are hypertrophic with encroachment on the lateral recesses, left greater than right, and encroachment on the posterior thecal sac along with ligamentum flavum hypertrophy.\par   \par  \par   \par  \par   \par  \par  L3-L4 annular disc bulge abutting the anterior thecal sac with associated inferior foraminal disc extensions abutting the exiting L3 nerve roots and abutting the adjacent traversing proximal neural fibers. Facets are hypertrophic with impingement on the lateral recesses and encroachment on the posterior thecal sac along with ligamentum flavum hypertrophy.\par   \par  \par   \par  \par   \par  \par  L4-L5 annular disc bulge abutting the anterior thecal sac with associated inferior foraminal disc extensions encroaching on the exiting L4 nerve roots, right slightly greater than left, and abutting the adjacent traversing proximal right neural fibers. Facets are hypertrophic with impingement on the lateral recesses. Central stenosis related to the above and ligamentum flavum hypertrophy.\par   \par  \par   \par  \par   \par  \par  L5-S1 annular disc bulge abutting the anterior thecal sac with associated inferior foraminal disc extensions abutting the exiting L5 nerve roots and abutting the traversing proximal right S1 nerve root. Facets are hypertrophic with impingement on the lateral recesses. Shallow central canal related to the above.\par   \par  \par   \par  \par   \par  \par  Loss of vertebral body height of T12 without underlying edema, nonspecific, and could be related to chronic compression injury. Clinical correlation and correlation with plain films if necessary is suggested.\par   \par  \par   \par  \par   \par  \par  Lesion in the right posterior L3 vertebra, nonspecific, but consistent with a hemangioma. There may be a small hemangioma in the right sacrum as well. Periodic follow-up is suggested to assess stability.

## 2024-10-02 NOTE — ASSESSMENT
[FreeTextEntry1] : 48 M with low back pain  Painw worsened despite HEP and  NSAIDS MRI L spine MDP FU after MRI

## 2024-10-07 ENCOUNTER — APPOINTMENT (OUTPATIENT)
Dept: PAIN MANAGEMENT | Facility: CLINIC | Age: 49
End: 2024-10-07

## 2024-10-11 ENCOUNTER — APPOINTMENT (OUTPATIENT)
Dept: PAIN MANAGEMENT | Facility: CLINIC | Age: 49
End: 2024-10-11
Payer: OTHER MISCELLANEOUS

## 2024-10-11 VITALS — BODY MASS INDEX: 36.44 KG/M2 | WEIGHT: 269 LBS | HEIGHT: 72 IN

## 2024-10-11 DIAGNOSIS — M54.16 RADICULOPATHY, LUMBAR REGION: ICD-10-CM

## 2024-10-11 DIAGNOSIS — Z78.9 OTHER SPECIFIED HEALTH STATUS: ICD-10-CM

## 2024-10-11 PROCEDURE — 99204 OFFICE O/P NEW MOD 45 MIN: CPT

## 2024-10-29 ENCOUNTER — RX RENEWAL (OUTPATIENT)
Age: 49
End: 2024-10-29

## 2024-11-15 ENCOUNTER — APPOINTMENT (OUTPATIENT)
Dept: ORTHOPEDIC SURGERY | Facility: CLINIC | Age: 49
End: 2024-11-15
Payer: OTHER MISCELLANEOUS

## 2024-11-15 DIAGNOSIS — M54.16 RADICULOPATHY, LUMBAR REGION: ICD-10-CM

## 2024-11-15 DIAGNOSIS — S39.012A STRAIN OF MUSCLE, FASCIA AND TENDON OF LOWER BACK, INITIAL ENCOUNTER: ICD-10-CM

## 2024-11-15 PROCEDURE — 99213 OFFICE O/P EST LOW 20 MIN: CPT

## 2024-12-09 ENCOUNTER — RX RENEWAL (OUTPATIENT)
Age: 49
End: 2024-12-09

## 2025-01-17 ENCOUNTER — APPOINTMENT (OUTPATIENT)
Dept: ORTHOPEDIC SURGERY | Facility: CLINIC | Age: 50
End: 2025-01-17

## 2025-02-12 ENCOUNTER — RX RENEWAL (OUTPATIENT)
Age: 50
End: 2025-02-12

## 2025-04-18 ENCOUNTER — APPOINTMENT (OUTPATIENT)
Dept: ORTHOPEDIC SURGERY | Facility: CLINIC | Age: 50
End: 2025-04-18
Payer: OTHER MISCELLANEOUS

## 2025-04-18 VITALS — HEIGHT: 72 IN | BODY MASS INDEX: 36.44 KG/M2 | WEIGHT: 269 LBS

## 2025-04-18 DIAGNOSIS — M54.6 PAIN IN THORACIC SPINE: ICD-10-CM

## 2025-04-18 PROCEDURE — 99214 OFFICE O/P EST MOD 30 MIN: CPT

## 2025-04-18 PROCEDURE — 72070 X-RAY EXAM THORAC SPINE 2VWS: CPT

## 2025-04-25 ENCOUNTER — APPOINTMENT (OUTPATIENT)
Dept: MRI IMAGING | Facility: CLINIC | Age: 50
End: 2025-04-25

## 2025-05-02 ENCOUNTER — NON-APPOINTMENT (OUTPATIENT)
Age: 50
End: 2025-05-02

## 2025-05-02 ENCOUNTER — APPOINTMENT (OUTPATIENT)
Dept: ORTHOPEDIC SURGERY | Facility: CLINIC | Age: 50
End: 2025-05-02
Payer: OTHER MISCELLANEOUS

## 2025-05-02 VITALS — WEIGHT: 269 LBS | HEIGHT: 72 IN | BODY MASS INDEX: 36.44 KG/M2

## 2025-05-02 DIAGNOSIS — M54.6 PAIN IN THORACIC SPINE: ICD-10-CM

## 2025-05-02 DIAGNOSIS — M54.16 RADICULOPATHY, LUMBAR REGION: ICD-10-CM

## 2025-05-02 PROCEDURE — 99213 OFFICE O/P EST LOW 20 MIN: CPT

## 2025-05-02 RX ORDER — METHYLPREDNISOLONE 4 MG/1
4 TABLET ORAL
Qty: 1 | Refills: 0 | Status: ACTIVE | COMMUNITY
Start: 2025-05-02 | End: 1900-01-01

## 2025-06-04 ENCOUNTER — APPOINTMENT (OUTPATIENT)
Dept: ORTHOPEDIC SURGERY | Facility: CLINIC | Age: 50
End: 2025-06-04
Payer: OTHER MISCELLANEOUS

## 2025-06-04 VITALS — BODY MASS INDEX: 35.89 KG/M2 | WEIGHT: 265 LBS | HEIGHT: 72 IN

## 2025-06-04 DIAGNOSIS — M54.6 PAIN IN THORACIC SPINE: ICD-10-CM

## 2025-06-04 DIAGNOSIS — M54.16 RADICULOPATHY, LUMBAR REGION: ICD-10-CM

## 2025-06-04 DIAGNOSIS — S39.012A STRAIN OF MUSCLE, FASCIA AND TENDON OF LOWER BACK, INITIAL ENCOUNTER: ICD-10-CM

## 2025-06-04 PROCEDURE — 99213 OFFICE O/P EST LOW 20 MIN: CPT

## 2025-06-04 RX ORDER — DICLOFENAC SODIUM 75 MG/1
75 TABLET, DELAYED RELEASE ORAL
Qty: 30 | Refills: 1 | Status: ACTIVE | COMMUNITY
Start: 2025-06-04 | End: 1900-01-01

## 2025-06-04 RX ORDER — TIZANIDINE 4 MG/1
4 TABLET ORAL TWICE DAILY
Qty: 21 | Refills: 0 | Status: ACTIVE | COMMUNITY
Start: 2025-06-04 | End: 1900-01-01

## 2025-07-18 ENCOUNTER — APPOINTMENT (OUTPATIENT)
Dept: ORTHOPEDIC SURGERY | Facility: CLINIC | Age: 50
End: 2025-07-18
Payer: OTHER MISCELLANEOUS

## 2025-07-18 VITALS — BODY MASS INDEX: 35.89 KG/M2 | WEIGHT: 265 LBS | HEIGHT: 72 IN

## 2025-07-18 PROCEDURE — 99213 OFFICE O/P EST LOW 20 MIN: CPT

## 2025-08-08 NOTE — ASU PREOP CHECKLIST - TYPE OF SOLUTION
Patient: Yuridia Suazo    Procedure Summary       Date: 08/08/25 Room / Location: [facility] OR 03 / [facility]    Anesthesia Start: 0845 Anesthesia Stop: 0919    Procedure: EXCISION, LESION, debridement of pilonidal cyst Diagnosis:       Pilonidal disease      (Pilonidal disease [L98.8])    Surgeons: Vale Sesay MD Responsible Provider: Liliana Sherman DO    Anesthesia Type: MAC ASA Status: 2            Anesthesia Type: MAC    Vitals Value Taken Time   BP 96/55 08/08/25 10:15   Temp 36.4 °C (97.5 °F) 08/08/25 10:15   Pulse 52 08/08/25 10:15   Resp 16 08/08/25 10:15   SpO2 98 % 08/08/25 10:15       Anesthesia Post Evaluation    Patient location during evaluation: PACU  Patient participation: complete - patient participated  Level of consciousness: awake  Pain management: satisfactory to patient  Multimodal analgesia pain management approach  Airway patency: patent  Cardiovascular status: acceptable  Respiratory status: acceptable  Hydration status: acceptable  Postoperative Nausea and Vomiting: none        No notable events documented.    
normal saline

## 2025-08-15 ENCOUNTER — APPOINTMENT (OUTPATIENT)
Dept: ORTHOPEDIC SURGERY | Facility: CLINIC | Age: 50
End: 2025-08-15
Payer: OTHER MISCELLANEOUS

## 2025-08-15 VITALS — HEIGHT: 72 IN | WEIGHT: 265 LBS | BODY MASS INDEX: 35.89 KG/M2

## 2025-08-15 DIAGNOSIS — M54.16 RADICULOPATHY, LUMBAR REGION: ICD-10-CM

## 2025-08-15 PROCEDURE — 99213 OFFICE O/P EST LOW 20 MIN: CPT
